# Patient Record
Sex: FEMALE
[De-identification: names, ages, dates, MRNs, and addresses within clinical notes are randomized per-mention and may not be internally consistent; named-entity substitution may affect disease eponyms.]

---

## 2019-06-04 ENCOUNTER — HOSPITAL ENCOUNTER (EMERGENCY)
Dept: HOSPITAL 5 - ED | Age: 39
Discharge: HOME | End: 2019-06-04
Payer: COMMERCIAL

## 2019-06-04 VITALS — SYSTOLIC BLOOD PRESSURE: 149 MMHG | DIASTOLIC BLOOD PRESSURE: 102 MMHG

## 2019-06-04 DIAGNOSIS — M43.6: Primary | ICD-10-CM

## 2019-06-04 PROCEDURE — 96372 THER/PROPH/DIAG INJ SC/IM: CPT

## 2019-06-04 PROCEDURE — 99282 EMERGENCY DEPT VISIT SF MDM: CPT

## 2019-06-04 NOTE — EMERGENCY DEPARTMENT REPORT
ED Neck Pain/Injury HPI





- General


Chief Complaint: Neck Pain/Injury


Stated Complaint: NECK PAIN


Time Seen by Provider: 06/04/19 08:12


Mode of arrival: Ambulatory


Limitations: No Limitations





- History of Present Illness


Initial Comments: 





39-year-old female presents to ED with left neck pain 2 days.  Patient says 

that she awoke 2 days ago the pain was all, however has progressed over the last

2 days.  Patient denies trauma, fever, nausea or vomiting.  She denies any 

recent chiropractic visits for chest movements.  Patient denies any numbness or 

weakness in her extremities.  Patient states this occurred approximately one 

month ago, however it wasn't as bad.  Patient states the pain is worse when she 

attempts to rotate her head leftward.  No relief with Tylenol or morelia morton MD Complaint: neck pain


-: days(s) (2)


Place: home


Radiation: occiput


Severity: moderate


Quality: aching


Consistency: intermittent


Improves With: immobilization


Worsens With: movement of neck


Context: unknown


Associated Symptoms: denies: headache, fever, numbness, tingling, weakness, 

vertigo, difficulty walking, difficulty swallowing, nausea, vomiting


Treatments Prior to Arrival: Acetaminophen, other (tiger balm)





- Related Data


                                  Previous Rx's











 Medication  Instructions  Recorded  Last Taken  Type


 


Naproxen [Naprosyn] 500 mg PO BID #20 tablet 06/04/19 Unknown Rx


 


methOCARBAMOL [Robaxin TAB] 500 mg PO Q8HR PRN #20 tablet 06/04/19 Unknown Rx


 


predniSONE [Deltasone] 50 mg PO QDAY #5 tab 06/04/19 Unknown Rx


 


traMADol [Ultram] 50 mg PO Q6HR PRN #7 tablet 06/04/19 Unknown Rx











                                    Allergies











Allergy/AdvReac Type Severity Reaction Status Date / Time


 


No Known Allergies Allergy   Unverified 06/04/19 07:51














ED Review of Systems


ROS: 


Stated complaint: NECK PAIN


Other details as noted in HPI





Comment: All other systems reviewed and negative


Constitutional: denies: chills, fever


Respiratory: denies: cough


Gastrointestinal: denies: nausea, vomiting


Musculoskeletal: as per HPI


Neurological: denies: headache, weakness, numbness, paresthesias, abnormal gait,

 vertigo





ED Past Medical Hx





- Past Medical History


Previous Medical History?: No





- Surgical History


Past Surgical History?: No





- Medications


Home Medications: 


                                Home Medications











 Medication  Instructions  Recorded  Confirmed  Last Taken  Type


 


Naproxen [Naprosyn] 500 mg PO BID #20 tablet 06/04/19  Unknown Rx


 


methOCARBAMOL [Robaxin TAB] 500 mg PO Q8HR PRN #20 tablet 06/04/19  Unknown Rx


 


predniSONE [Deltasone] 50 mg PO QDAY #5 tab 06/04/19  Unknown Rx


 


traMADol [Ultram] 50 mg PO Q6HR PRN #7 tablet 06/04/19  Unknown Rx














ED Physical Exam





- General


Limitations: No Limitations


General appearance: alert, in no apparent distress





- Head


Head exam: Present: atraumatic, normocephalic





- Eye


Eye exam: Present: normal appearance, PERRL, EOMI





- ENT


ENT exam: Present: mucous membranes moist





- Neck


Neck exam: Present: tenderness (left paraspinal tenderness along trapezius 

muscle, spasm palpable; decreased ROM, worse with leftward rotation)





- Respiratory


Respiratory exam: Present: normal lung sounds bilaterally.  Absent: respiratory 

distress





- Cardiovascular


Cardiovascular Exam: Present: regular rate, normal rhythm





- GI/Abdominal


GI/Abdominal exam: Absent: distended





- Extremities Exam


Extremities exam: Present: normal inspection





- Neurological Exam


Neurological exam: Present: alert, oriented X3, CN II-XII intact.  Absent: motor

 sensory deficit





- Psychiatric


Psychiatric exam: Present: normal affect, normal mood





- Skin


Skin exam: Present: warm, dry, intact, normal color.  Absent: rash





ED Course


                                   Vital Signs











  06/04/19





  07:54


 


Temperature 98.0 F


 


Pulse Rate 102 H


 


Respiratory 18





Rate 


 


Blood Pressure 149/102


 


O2 Sat by Pulse 99





Oximetry 














- Reevaluation(s)


Reevaluation #1: 





06/04/19 09:48


Pt given toradol and decadron.  States is feeling much better.  Pt has improved 

ROM with rotation on exam.





ED Medical Decision Making





- Medical Decision Making





- recurrence of acute torticollis


- no neuro deficits on exam


- afebrile


- denies trauma


- improved greatly w/ toradol and decadron


- return precautions given


- outpt f/u advised


- rx given for naprosyn, prednisone, robaxin, ultram





- Differential Diagnosis


torticollis


Critical care attestation.: 


If time is entered above; I have spent that time in minutes in the direct care 

of this critically ill patient, excluding procedure time.








ED Disposition


Clinical Impression: 


 Torticollis, acute





Disposition: DC-01 TO HOME OR SELFCARE


Is pt being admited?: No


Condition: Stable


Instructions:  Spasmodic Torticollis (ED)


Prescriptions: 


predniSONE [Deltasone] 50 mg PO QDAY #5 tab


Naproxen [Naprosyn] 500 mg PO BID #20 tablet


methOCARBAMOL [Robaxin TAB] 500 mg PO Q8HR PRN #20 tablet


 PRN Reason: Muscle Spasm


traMADol [Ultram] 50 mg PO Q6HR PRN #7 tablet


 PRN Reason: Pain


Referrals: 


[facility] MD [Primary Care Provider] - 3-5 Days


CHAVEZ BOATENG MD [Staff Physician] - 3-5 Days


Time of Disposition: 09:50